# Patient Record
Sex: FEMALE | Race: AMERICAN INDIAN OR ALASKA NATIVE | NOT HISPANIC OR LATINO | URBAN - METROPOLITAN AREA
[De-identification: names, ages, dates, MRNs, and addresses within clinical notes are randomized per-mention and may not be internally consistent; named-entity substitution may affect disease eponyms.]

---

## 2021-06-14 ENCOUNTER — EMERGENCY (EMERGENCY)
Facility: HOSPITAL | Age: 31
LOS: 0 days | Discharge: HOME | End: 2021-06-15
Attending: STUDENT IN AN ORGANIZED HEALTH CARE EDUCATION/TRAINING PROGRAM | Admitting: STUDENT IN AN ORGANIZED HEALTH CARE EDUCATION/TRAINING PROGRAM
Payer: MEDICAID

## 2021-06-14 VITALS
SYSTOLIC BLOOD PRESSURE: 126 MMHG | RESPIRATION RATE: 18 BRPM | HEART RATE: 80 BPM | TEMPERATURE: 97 F | DIASTOLIC BLOOD PRESSURE: 86 MMHG | WEIGHT: 149.91 LBS | OXYGEN SATURATION: 100 %

## 2021-06-14 DIAGNOSIS — R10.32 LEFT LOWER QUADRANT PAIN: ICD-10-CM

## 2021-06-14 DIAGNOSIS — N94.89 OTHER SPECIFIED CONDITIONS ASSOCIATED WITH FEMALE GENITAL ORGANS AND MENSTRUAL CYCLE: ICD-10-CM

## 2021-06-14 DIAGNOSIS — R11.0 NAUSEA: ICD-10-CM

## 2021-06-14 DIAGNOSIS — R10.31 RIGHT LOWER QUADRANT PAIN: ICD-10-CM

## 2021-06-14 DIAGNOSIS — Z87.42 PERSONAL HISTORY OF OTHER DISEASES OF THE FEMALE GENITAL TRACT: ICD-10-CM

## 2021-06-14 PROCEDURE — 99284 EMERGENCY DEPT VISIT MOD MDM: CPT

## 2021-06-14 NOTE — ED ADULT TRIAGE NOTE - CHIEF COMPLAINT QUOTE
pt c/o abdominal pain started yesterday worsesned today around 7 pm ,   unknown lmp, pt breastfeeding

## 2021-06-15 LAB
ALBUMIN SERPL ELPH-MCNC: 4.6 G/DL — SIGNIFICANT CHANGE UP (ref 3.5–5.2)
ALP SERPL-CCNC: 106 U/L — SIGNIFICANT CHANGE UP (ref 30–115)
ALT FLD-CCNC: 13 U/L — SIGNIFICANT CHANGE UP (ref 0–41)
ANION GAP SERPL CALC-SCNC: 8 MMOL/L — SIGNIFICANT CHANGE UP (ref 7–14)
APPEARANCE UR: CLEAR — SIGNIFICANT CHANGE UP
AST SERPL-CCNC: 13 U/L — SIGNIFICANT CHANGE UP (ref 0–41)
BACTERIA # UR AUTO: NEGATIVE — SIGNIFICANT CHANGE UP
BASOPHILS # BLD AUTO: 0.04 K/UL — SIGNIFICANT CHANGE UP (ref 0–0.2)
BASOPHILS NFR BLD AUTO: 0.6 % — SIGNIFICANT CHANGE UP (ref 0–1)
BILIRUB SERPL-MCNC: 0.3 MG/DL — SIGNIFICANT CHANGE UP (ref 0.2–1.2)
BILIRUB UR-MCNC: NEGATIVE — SIGNIFICANT CHANGE UP
BUN SERPL-MCNC: 12 MG/DL — SIGNIFICANT CHANGE UP (ref 10–20)
CALCIUM SERPL-MCNC: 9.3 MG/DL — SIGNIFICANT CHANGE UP (ref 8.5–10.1)
CHLORIDE SERPL-SCNC: 102 MMOL/L — SIGNIFICANT CHANGE UP (ref 98–110)
CO2 SERPL-SCNC: 28 MMOL/L — SIGNIFICANT CHANGE UP (ref 17–32)
COLOR SPEC: YELLOW — SIGNIFICANT CHANGE UP
CREAT SERPL-MCNC: 0.6 MG/DL — LOW (ref 0.7–1.5)
DIFF PNL FLD: SIGNIFICANT CHANGE UP
EOSINOPHIL # BLD AUTO: 0.36 K/UL — SIGNIFICANT CHANGE UP (ref 0–0.7)
EOSINOPHIL NFR BLD AUTO: 5.1 % — SIGNIFICANT CHANGE UP (ref 0–8)
EPI CELLS # UR: 2 /HPF — SIGNIFICANT CHANGE UP (ref 0–5)
GLUCOSE SERPL-MCNC: 89 MG/DL — SIGNIFICANT CHANGE UP (ref 70–99)
GLUCOSE UR QL: NEGATIVE — SIGNIFICANT CHANGE UP
HCG SERPL QL: NEGATIVE — SIGNIFICANT CHANGE UP
HCT VFR BLD CALC: 37.1 % — SIGNIFICANT CHANGE UP (ref 37–47)
HGB BLD-MCNC: 12.6 G/DL — SIGNIFICANT CHANGE UP (ref 12–16)
HYALINE CASTS # UR AUTO: 2 /LPF — SIGNIFICANT CHANGE UP (ref 0–7)
IMM GRANULOCYTES NFR BLD AUTO: 0.3 % — SIGNIFICANT CHANGE UP (ref 0.1–0.3)
KETONES UR-MCNC: SIGNIFICANT CHANGE UP
LACTATE SERPL-SCNC: 1.5 MMOL/L — SIGNIFICANT CHANGE UP (ref 0.7–2)
LEUKOCYTE ESTERASE UR-ACNC: ABNORMAL
LIDOCAIN IGE QN: 42 U/L — SIGNIFICANT CHANGE UP (ref 7–60)
LYMPHOCYTES # BLD AUTO: 2.84 K/UL — SIGNIFICANT CHANGE UP (ref 1.2–3.4)
LYMPHOCYTES # BLD AUTO: 40.2 % — SIGNIFICANT CHANGE UP (ref 20.5–51.1)
MCHC RBC-ENTMCNC: 31.8 PG — HIGH (ref 27–31)
MCHC RBC-ENTMCNC: 34 G/DL — SIGNIFICANT CHANGE UP (ref 32–37)
MCV RBC AUTO: 93.7 FL — SIGNIFICANT CHANGE UP (ref 81–99)
MONOCYTES # BLD AUTO: 0.56 K/UL — SIGNIFICANT CHANGE UP (ref 0.1–0.6)
MONOCYTES NFR BLD AUTO: 7.9 % — SIGNIFICANT CHANGE UP (ref 1.7–9.3)
NEUTROPHILS # BLD AUTO: 3.25 K/UL — SIGNIFICANT CHANGE UP (ref 1.4–6.5)
NEUTROPHILS NFR BLD AUTO: 45.9 % — SIGNIFICANT CHANGE UP (ref 42.2–75.2)
NITRITE UR-MCNC: NEGATIVE — SIGNIFICANT CHANGE UP
NRBC # BLD: 0 /100 WBCS — SIGNIFICANT CHANGE UP (ref 0–0)
PH UR: 6 — SIGNIFICANT CHANGE UP (ref 5–8)
PLATELET # BLD AUTO: 261 K/UL — SIGNIFICANT CHANGE UP (ref 130–400)
POTASSIUM SERPL-MCNC: 4.2 MMOL/L — SIGNIFICANT CHANGE UP (ref 3.5–5)
POTASSIUM SERPL-SCNC: 4.2 MMOL/L — SIGNIFICANT CHANGE UP (ref 3.5–5)
PROT SERPL-MCNC: 7.1 G/DL — SIGNIFICANT CHANGE UP (ref 6–8)
PROT UR-MCNC: SIGNIFICANT CHANGE UP
RBC # BLD: 3.96 M/UL — LOW (ref 4.2–5.4)
RBC # FLD: 11.7 % — SIGNIFICANT CHANGE UP (ref 11.5–14.5)
RBC CASTS # UR COMP ASSIST: 3 /HPF — SIGNIFICANT CHANGE UP (ref 0–4)
SODIUM SERPL-SCNC: 138 MMOL/L — SIGNIFICANT CHANGE UP (ref 135–146)
SP GR SPEC: 1.03 — SIGNIFICANT CHANGE UP (ref 1.01–1.03)
UROBILINOGEN FLD QL: SIGNIFICANT CHANGE UP
WBC # BLD: 7.07 K/UL — SIGNIFICANT CHANGE UP (ref 4.8–10.8)
WBC # FLD AUTO: 7.07 K/UL — SIGNIFICANT CHANGE UP (ref 4.8–10.8)
WBC UR QL: 17 /HPF — HIGH (ref 0–5)

## 2021-06-15 PROCEDURE — 76830 TRANSVAGINAL US NON-OB: CPT | Mod: 26

## 2021-06-15 PROCEDURE — 74177 CT ABD & PELVIS W/CONTRAST: CPT | Mod: 26,MA

## 2021-06-15 RX ORDER — ACETAMINOPHEN 500 MG
650 TABLET ORAL ONCE
Refills: 0 | Status: COMPLETED | OUTPATIENT
Start: 2021-06-15 | End: 2021-06-15

## 2021-06-15 NOTE — ED PROVIDER NOTE - OBJECTIVE STATEMENT
31 year old female with pmhx of ovarian torsion,  presents to ED with lower abdominal pain x few days. Pain to lower abdomen, L>R. cramping, no radiation. mild nausea. no vomiting, diarrhea, fever, chills, chest pain, sob, urinary symptoms, vaginal bleeding or discharge.

## 2021-06-15 NOTE — ED PROVIDER NOTE - NSFOLLOWUPINSTRUCTIONS_ED_ALL_ED_FT
????????????????(?????? 6 ????)????????????????????????,?????????????(??????)????????????????????????????????    ???????????????,?????????????????????????????    ?????????????????? 20 ? 45 ???,???????????    ??????????  ??????????????,????????????????,??????????????  Pénqiang yuxie zònghé mckee [facility] shì [facility] pénqiang téngtòng (pénqiang téngtòng chíxù 6 gè yuè yishàng) de [facility]. Téngtòng de fa sulma shì yinwèi xieyè zài gupén jìngmài anjali lyle, zhèxie jìngmài yijing kuòzhang bìng biàn dé fùzá (nik arias jìngmài donaldzhang). Yóu ci chansheng de téngtòng youí huì emily kochruò. Cí jisù kenéng you zhù yú zhèxie jìngmài de fa yù.    Henduo yùlíng fùnu de gupén kenyon you jìngmài quzhang, raeann celesting bùshì suoyou rén kenyon you zhèngzhuàng. Wèishéme youxie nuxìng huì chuxiàn zhèngzhuàng shànOrlando Health St. Cloud Hospitalu.    Dà duoshù huàn you pénqiang chongxuè zònghé mckee de nuxìng niánlíng zài 20 zhì 45 suì dalton srini, bìngqie youguò shahrzad cì huáiyùn jinglì.    Pénqiang chongxuè zòntamyé mckee de festusrosalindamaurizio  chiquita weaver you pénpepe chongxuè zòntamyé mckee de aakash williams, tong delgadillo, remi gonzales mildred richards.    ??,???????,??????????????????(???????????)????,????????????????????????????????????????????    ???????????????????????    ??????????????????????    ??????????  ????????????  ???????  ????????????????????????,???????????????????????????,???????? 6 ????,??????????????    ????????????????????????????????,????????????????????????????????(?????????????????????? X ??)???????? (CT)?????? (MRI) ?????????    ??????????????,??????????????,??????????????????????????????    ??????????  ?????????????????  ????,???????????????  ???????????????????????(????????????)???????? (NSAID) ?????????? (GnRH) ???(????????????),??????????,???????????    ????????,?????????????????,???????????????????:    ????:?????????????????,??????????????,????????????(??)????????????????????????????????????????????????  ????:??,??????????????????????????????  ????????????????,????????  Tongcháng, téngtòng shì raeann gonzalez de rosalindaò karina cox de. Anais saravia shí (nuxìng cháng shíjian zuòzhe [facility]) qíngkuàng gèng zao, archibald xià hòu huì you suo huanjie. Xìnglisa martinez zhOhioHealth Arthur [facility], [facility] de téngtòng broderick vieraDzilth-Na-O-Dith-Hle Health Centercrys. Bashir tongcháng bàn you yaotòng, tui bù téngtòng hé yindào yìMercy Hospitalcrys chue.    Youxie nuxìng ou'er huì trangg yindina alba páichu tòumíng huò shui yàng de [facility].    Qíta zhèngzhuàng kenéng baokuò píláo, qíngxù bodòng, tóutelmer ball.    Pénqiang chongxuè zònghé mckee de zheBanner Ironwood Medical Center  chaosheng jianchá huò qíta yingxiàng xué jianchá  youshí fùqiang jìng jianchá  kwon nuxìng you pénqiang téngtòng dàn pénqiang jianchá wèi faxiàn yánzhèng huò qíta yìcháng shí, yisheng kenéng huì huáiyí pénqiang yuxie zònghé mckee. Yisheng zhenduàn gupén chongxuè daniellaghé mckee shí, téngtòng bìxu yijing cúnzài 6 gè yuè yishàng, bìngqie zài jianchá shí luancháo bìxu lilly oshea.    Chaoshengbo jianchá pénqiang jìngmài quzhang keyi bangRoosevelt General Hospital yisheng quèrèn pénqiang yuxie daniellaghé mckee de [facility]. Dànshì, kenéng xuyào jìnxíng lìng yicì yingxiàng xué jianchá lái quèrèn [facility]. Zhèxie cèshì kenéng baokuò jìngmài zàoying (john connor tòushèxiàn de zàoying karina rader fùgugou jìngmài hòu paishè de jìngmài X linda huitron), gold glover (CT), cí gòngtreasurehèn mitzi (MRI) hé cí michelhèn jìngmài zàoying.    Rúguo téngtòng hen máfan qie yuányin shàngwèi quèdìng, zé jìnxíng fùqiang jìng jianchá. Zài ci aletha alba, poppy tellezì zài alexandroqí amanda naylor mamie qièkou bìng charù rosa whitehead [place] zhíjie guanchá gupén jiégòu.    Pénqiang chongxevan ashtoné mckee de zhìliáo  tongarmin mandel yùn jimarilyn goldman huò glen joby ti kàng yán yào  rúguo xuyào, cyndy chéngxù lái zuzhi xieyè liúxiàng jìngmài quzjassig  tongParkview Health ziggy mccain. Bashir alexander ( xìngjisù huángti tóng de AR[facility] the Way Hospitalhén xínLifePoint Health). Glen joby ti lèi kàng aylin taylor (NSAID) huò cù xìngxiàn jisù shìfmaurizio alexander (GnRH) igor collins (shenti chansheng de jisù de [facility]), rú liàng jacinto ruì lín hé nà fa ruì lín, ye kenéng you zhù yú huanjie téngtòng.    Milenauo zhèxie yàowù wútulioào, yisheng kenjovanny tellezì shìtú zuzhi xieyè liúxiàng jìngmài qulatrell, cóng'ér fángdalton xieyè zài nàli serina rouse keyòng:    Ziongmài taniai: Chiquita garces mázuì dàtui de [place] mamie kuài delicia buenou, poppy zài nàli zuò yigè mamie qièkou. [facility], tamen john lieberman gen xì ér róuruan de [facility]da[facility]) tongguò qièkou charù jìngmài jana alberto jìngmài nir. Tamen john wéixiao de tomyflor, [facility] Hospitaltre hudiana gonzalez zhuàng yèti tongArtesia General Hospital daoguan charù jìngmài [place] zusè tamen.  Yìnghuà liáofa: poppy [place]ù daogsherley celesting tongò dauan john morangyè zhùrù jìngmài quzjassig. Anil amandausè jìngmài.  Johnna zaragoza jìngmài adán cervantes. Pénqiang yuxie zònghé mckee sì shì mànxìng pénqiang téngtòng (pénqiang téngtòng chíxù 6 gè yuè yishàng) de arminjennifer pradoSlovaktre. Téngtòng de fa sulma shì yinwèi xieyè zài gupén jìngmài anjali lyle, zhèxie jìngmài anuja kuòzjassig bìng biàn dé fùzá (nik loyola). Yóu ci chansheng de téngtòng youshí huì emily rén xuruò. Cí jisù kenéng you misti alejo zhèxie jìngmài de fa yù.    Henduo yùlíng fùnu de gupén kenyon you gudelia loyola, raeann bates bùshì suoyou rén kenyon you festusmaurizio. Wèishéme youxie nuxìng huì chuxiàn zhèngtreasuremaurizio connor qingu.    Dà duoshù hujennifer you pénqiang chongxuè zònghé mckee de nuMedfield State Hospital zài 20 zhì 45 suì dalton milligan, bìngqie youguò ji cì huáiyùn jinglì.    Pénqiang chongxuè zònghé mckee de michaelCibola General Hospitali hujennifer you pénqiang chongxuè zònghé mckee de nuFreeman Neosho Hospital williams, dianeqiang téngtòng tongchácrys Mountain View Hospitalhen zài huáiyùn tim martinez zhandrzej, sabigqie suízhe andrews cì huáiyùn ér qu yú èhuà.          Elena, téngtòng raeann irving de michelle cox de. Yitian jiéshù shí (nuxìng cháng shíjian zuòzhe huò zhànlì hòu) qíngmariola gènedilma guerra archibald iva hòu huì you suo karle. Xìngjiao tim martinez zhihòu de téngtUMass Memorial Medical Center broderick lizama. Ta tongcháng bàn you yaotòcrys tui geeta téngtòng hé yindào yìcháng chuxie.    Youxie nuxìng ou'er huì trangg yasmin alba páiisabelu tòumíng huò shui yàng de fenmì wù.    Qíta zhèngzhuàng kenéng baokuò píláo, qíngxù bodòng, tóutòng hé fùzhàng.    Pénqiang chongxuè polié mckee de zhenduàn  chaosheng jianchá huò qíta yingxiàng xué jianchá  youshí fùqiang jìng jianchá  kwon nuxìng you pénqiang téngtòng dàn pénqiang jianchá wèi faxijennifer yántreasurehèng huò qíta yìcháng shí, yisheng kenéng huì huáiyí pénqiang yuxie polié mckee. Yisheng zhenduàn gupén chongxuè polié mckee shí, téngtòng bìxu yijing cúnzài 6 gè yuè yishàng, bìngqie zài jianchá shí luancháo bìxu lilly oshea.    Chaoshengbo jianchá pénqiang jìngmài quzhang keyi banghù yisheng quèrèn pénqiang barrington mckee de [facility]. Dànshì, kenéng xuyào jìnxíng lìng yicì yingxiàng xué jianchá lái quèrèn zhendCatawba Valley Medical Center. Zhèxie cèshì kenéng baokuò jìngmài zàoying (john sniderushèxiàn de zàoying karina rader fùgugou jìngmài hòu paishè de jìngmài SARAH huitron), jìsuànji duàncéng saomiáo (CT), cí gòngzhèn chéngxiàng (MRI) hé cí gòngzhèn jìngmài zàoying.    Rúguo téngtòng hen máfan qie johanyin maei quèstanley, zé jìnxíng fùqiang birdg jianchá. Zài ci aletha alba, yisulma huì zài dùqí xiàjosafat liebermangè mamie qièkou bìng charù guanedwar charley [place] zhíjie guanchá gupén jiégòu.    Pénqiang chongxevan ashtoné mckee de zhAlta View Hospitalo  Lawrence General Hospitalcrys goldman huò glen joby ti kàng yán yào  rúguo xuyào, yigè chéngxù lái zuzhi michaelyè liúxiàng jìngmài qusanag  tongchácrys mccain. Bashir baires jisù ( xìngjisù huángti tóng de héchéng xínBon Secours St. Mary's Hospital). Glen joby ti lèi kàng aylin claudia (NSAID) huò cù xìngxiàn jisù shìfmaurizio alexander (GnRH) igor collins (shenti chansheng de jisù de héchéng xíngshì), rú liàng jacinto ruì lín hé nà fa ruì lín, ye kenéng you zhù yú huanjie téngtòng.    Rúguo zhèxie yàcookieù wúxiào, yisheng kenjovanny tellezì andraú zuzmicah rodriguezyè liúxiàng jìngmài nir, trangg'mildred hintongdalton clark zài nàli dariela. Lilly mccrackenòng:    Jìngmài charliesai: Chiquita garces mázuì dàtui de [place] mamie irasema buenou, poppy zài nàli moris liebermangè mamie qièkou. [facility], tamen john lieberman gen xì ér róuruan de [facility] (daRipley County Memorial [facility]) tongguò qièkou souleymaneù birdgmàellen alberto jìngmài qulatrell. Tamen john brown de Christian Hospitalflor [facility] Hospitaltre gonzalez tongguò daoguan souleymaneù jìngmài luisana ann tamen.  Yìnghuà liáofa: poppy [place] daogsherley bates tongguò daoguan john rader jìngmài quzrosalba. Anil amandausè jìngmài.  Johnna berumeni liúxiàng radha zaragoza jìngmài adán cervantesCincinnati VA Medical Centercrys vera.

## 2021-06-15 NOTE — ED PROVIDER NOTE - PROGRESS NOTE DETAILS
DISCUSSED RESULTS WITH MANDARIAN , AWARE OF PELVIC CONGESTION SYNDROME., AND ALL OTHER RESULTS - WILL FOLLOW UP WITH HER GYN TOMORROW. PT FEELING MUCH BETTER, NO ACTIVE PAIN

## 2021-06-15 NOTE — ED PROVIDER NOTE - ATTENDING CONTRIBUTION TO CARE
31 yr old f w/ a pmh significant for ovarian torsion,  who presents with lower abd pain. Pt states that for the past couple of days she has been having lower abd pain. Pt denies any vaginal bleeding, vaginal discharge or urinary symptoms. Pt also denies any fevers, chills, nausea, vomiting, chest pain, sob or any other complaints.     VITAL SIGNS: I have reviewed nursing notes and confirm.  CONSTITUTIONAL: non-toxic, well appearing  SKIN: no rash, no petechiae.  EYES: EOMI, pink conjunctiva, anicteric  ENT: tongue midline, no exudates, MMM  NECK: Supple; no meningismus, no JVD  CARD: RRR, no murmurs, equal radial pulses bilaterally 2+  RESP: CTAB, no respiratory distress  ABD: Soft, tender in the lower abd, non-distended, no peritoneal signs, no HSM, no CVA tenderness  EXT: Normal ROM x4. No edema. No calves tenderness  NEURO: Alert, oriented. CN2-12 intact, equal strength bilaterally, nl gait.  PSYCH: Cooperative, appropriate.    a/p  31 yr old f that presents with abd pain   -labs  -ua  -us  -pain management  -dispo pending : [id number]    31 yr old f w/ a pmh significant for ovarian torsion,  who presents with lower abd pain. Pt states that for the past couple of days she has been having lower abd pain. Pt denies any vaginal bleeding, vaginal discharge or urinary symptoms. Pt also denies any fevers, chills, nausea, vomiting, chest pain, sob or any other complaints.     VITAL SIGNS: I have reviewed nursing notes and confirm.  CONSTITUTIONAL: non-toxic, well appearing  SKIN: no rash, no petechiae.  EYES: EOMI, pink conjunctiva, anicteric  ENT: tongue midline, no exudates, MMM  NECK: Supple; no meningismus, no JVD  CARD: RRR, no murmurs, equal radial pulses bilaterally 2+  RESP: CTAB, no respiratory distress  ABD: Soft, tender in the lower abd, non-distended, no peritoneal signs, no HSM, no CVA tenderness  EXT: Normal ROM x4. No edema. No calves tenderness  NEURO: Alert, oriented. CN2-12 intact, equal strength bilaterally, nl gait.  PSYCH: Cooperative, appropriate.    a/p  31 yr old f that presents with abd pain   -labs  -ua  -us  -pain management  -dispo pending

## 2021-06-15 NOTE — ED PROVIDER NOTE - PHYSICAL EXAMINATION
CONST: Well appearing in NAD  EYES: PERRL, EOMI, Sclera and conjunctiva clear.   NECK: Non-tender, no meningeal signs. normal ROM. supple   CARD: Normal S1 S2; Normal rate and rhythm  RESP: Equal BS B/L, No wheezes, rhonchi or rales. No distress  GI: Soft, mild left lower pelvic tenderness, non-distended. no cva tenderness. normal BS  GYN: no discharge or bleeding. no cmt . no adnexal tenderness  MS: Normal ROM in all extremities. No midline spinal tenderness. pulses 2 +. no calf tenderness or swelling  SKIN: Warm, dry, no acute rashes. Good turgor  NEURO: A&Ox3, No focal deficits.

## 2021-06-15 NOTE — ED PROVIDER NOTE - PATIENT PORTAL LINK FT
You can access the FollowMyHealth Patient Portal offered by [facility] by registering at the following website: [email or web address]. By joining [employer]’s FollowMyHealth portal, you will also be able to view your health information using other applications (apps) compatible with our system.

## 2021-06-15 NOTE — ED PROVIDER NOTE - CLINICAL SUMMARY MEDICAL DECISION MAKING FREE TEXT BOX
31 yr old f that presents with abd pain. labs, ua, us obtained. pt informed of all findings. pt discharged with gyn follow up and strict return precautions.

## 2021-06-16 LAB
CULTURE RESULTS: SIGNIFICANT CHANGE UP
SPECIMEN SOURCE: SIGNIFICANT CHANGE UP

## 2021-06-18 PROBLEM — Z00.00 ENCOUNTER FOR PREVENTIVE HEALTH EXAMINATION: Status: ACTIVE | Noted: 2021-06-18

## 2022-01-07 ENCOUNTER — EMERGENCY (EMERGENCY)
Facility: HOSPITAL | Age: 32
LOS: 0 days | Discharge: HOME | End: 2022-01-07
Attending: EMERGENCY MEDICINE | Admitting: EMERGENCY MEDICINE
Payer: MEDICAID

## 2022-01-07 VITALS
SYSTOLIC BLOOD PRESSURE: 115 MMHG | DIASTOLIC BLOOD PRESSURE: 77 MMHG | WEIGHT: 154.98 LBS | OXYGEN SATURATION: 100 % | HEART RATE: 62 BPM | TEMPERATURE: 98 F | RESPIRATION RATE: 18 BRPM

## 2022-01-07 DIAGNOSIS — R10.2 PELVIC AND PERINEAL PAIN: ICD-10-CM

## 2022-01-07 DIAGNOSIS — R11.0 NAUSEA: ICD-10-CM

## 2022-01-07 DIAGNOSIS — Z98.890 OTHER SPECIFIED POSTPROCEDURAL STATES: Chronic | ICD-10-CM

## 2022-01-07 DIAGNOSIS — K59.00 CONSTIPATION, UNSPECIFIED: ICD-10-CM

## 2022-01-07 DIAGNOSIS — Z87.42 PERSONAL HISTORY OF OTHER DISEASES OF THE FEMALE GENITAL TRACT: ICD-10-CM

## 2022-01-07 DIAGNOSIS — N39.0 URINARY TRACT INFECTION, SITE NOT SPECIFIED: ICD-10-CM

## 2022-01-07 LAB
ALBUMIN SERPL ELPH-MCNC: 4.4 G/DL — SIGNIFICANT CHANGE UP (ref 3.5–5.2)
ALP SERPL-CCNC: 80 U/L — SIGNIFICANT CHANGE UP (ref 30–115)
ALT FLD-CCNC: 27 U/L — SIGNIFICANT CHANGE UP (ref 0–41)
ANION GAP SERPL CALC-SCNC: 11 MMOL/L — SIGNIFICANT CHANGE UP (ref 7–14)
APPEARANCE UR: CLEAR — SIGNIFICANT CHANGE UP
AST SERPL-CCNC: 20 U/L — SIGNIFICANT CHANGE UP (ref 0–41)
BACTERIA # UR AUTO: NEGATIVE — SIGNIFICANT CHANGE UP
BASOPHILS # BLD AUTO: 0.05 K/UL — SIGNIFICANT CHANGE UP (ref 0–0.2)
BASOPHILS NFR BLD AUTO: 0.5 % — SIGNIFICANT CHANGE UP (ref 0–1)
BILIRUB SERPL-MCNC: <0.2 MG/DL — SIGNIFICANT CHANGE UP (ref 0.2–1.2)
BILIRUB UR-MCNC: NEGATIVE — SIGNIFICANT CHANGE UP
BUN SERPL-MCNC: 17 MG/DL — SIGNIFICANT CHANGE UP (ref 10–20)
CALCIUM SERPL-MCNC: 9.3 MG/DL — SIGNIFICANT CHANGE UP (ref 8.5–10.1)
CHLORIDE SERPL-SCNC: 102 MMOL/L — SIGNIFICANT CHANGE UP (ref 98–110)
CO2 SERPL-SCNC: 23 MMOL/L — SIGNIFICANT CHANGE UP (ref 17–32)
COLOR SPEC: SIGNIFICANT CHANGE UP
CREAT SERPL-MCNC: 0.7 MG/DL — SIGNIFICANT CHANGE UP (ref 0.7–1.5)
DIFF PNL FLD: SIGNIFICANT CHANGE UP
EOSINOPHIL # BLD AUTO: 0.29 K/UL — SIGNIFICANT CHANGE UP (ref 0–0.7)
EOSINOPHIL NFR BLD AUTO: 3.2 % — SIGNIFICANT CHANGE UP (ref 0–8)
EPI CELLS # UR: 5 /HPF — SIGNIFICANT CHANGE UP (ref 0–5)
GLUCOSE SERPL-MCNC: 90 MG/DL — SIGNIFICANT CHANGE UP (ref 70–99)
GLUCOSE UR QL: NEGATIVE — SIGNIFICANT CHANGE UP
HCT VFR BLD CALC: 40.4 % — SIGNIFICANT CHANGE UP (ref 37–47)
HGB BLD-MCNC: 13.3 G/DL — SIGNIFICANT CHANGE UP (ref 12–16)
HYALINE CASTS # UR AUTO: 7 /LPF — SIGNIFICANT CHANGE UP (ref 0–7)
IMM GRANULOCYTES NFR BLD AUTO: 1.9 % — HIGH (ref 0.1–0.3)
KETONES UR-MCNC: NEGATIVE — SIGNIFICANT CHANGE UP
LEUKOCYTE ESTERASE UR-ACNC: ABNORMAL
LYMPHOCYTES # BLD AUTO: 3.48 K/UL — HIGH (ref 1.2–3.4)
LYMPHOCYTES # BLD AUTO: 38.2 % — SIGNIFICANT CHANGE UP (ref 20.5–51.1)
MCHC RBC-ENTMCNC: 30.8 PG — SIGNIFICANT CHANGE UP (ref 27–31)
MCHC RBC-ENTMCNC: 32.9 G/DL — SIGNIFICANT CHANGE UP (ref 32–37)
MCV RBC AUTO: 93.5 FL — SIGNIFICANT CHANGE UP (ref 81–99)
MONOCYTES # BLD AUTO: 0.65 K/UL — HIGH (ref 0.1–0.6)
MONOCYTES NFR BLD AUTO: 7.1 % — SIGNIFICANT CHANGE UP (ref 1.7–9.3)
NEUTROPHILS # BLD AUTO: 4.46 K/UL — SIGNIFICANT CHANGE UP (ref 1.4–6.5)
NEUTROPHILS NFR BLD AUTO: 49.1 % — SIGNIFICANT CHANGE UP (ref 42.2–75.2)
NITRITE UR-MCNC: NEGATIVE — SIGNIFICANT CHANGE UP
NRBC # BLD: 0 /100 WBCS — SIGNIFICANT CHANGE UP (ref 0–0)
PH UR: 6.5 — SIGNIFICANT CHANGE UP (ref 5–8)
PLATELET # BLD AUTO: 277 K/UL — SIGNIFICANT CHANGE UP (ref 130–400)
POTASSIUM SERPL-MCNC: 4.4 MMOL/L — SIGNIFICANT CHANGE UP (ref 3.5–5)
POTASSIUM SERPL-SCNC: 4.4 MMOL/L — SIGNIFICANT CHANGE UP (ref 3.5–5)
PROT SERPL-MCNC: 6.9 G/DL — SIGNIFICANT CHANGE UP (ref 6–8)
PROT UR-MCNC: ABNORMAL
RBC # BLD: 4.32 M/UL — SIGNIFICANT CHANGE UP (ref 4.2–5.4)
RBC # FLD: 11.6 % — SIGNIFICANT CHANGE UP (ref 11.5–14.5)
RBC CASTS # UR COMP ASSIST: 2 /HPF — SIGNIFICANT CHANGE UP (ref 0–4)
SODIUM SERPL-SCNC: 136 MMOL/L — SIGNIFICANT CHANGE UP (ref 135–146)
SP GR SPEC: 1.03 — HIGH (ref 1.01–1.03)
UROBILINOGEN FLD QL: SIGNIFICANT CHANGE UP
WBC # BLD: 9.1 K/UL — SIGNIFICANT CHANGE UP (ref 4.8–10.8)
WBC # FLD AUTO: 9.1 K/UL — SIGNIFICANT CHANGE UP (ref 4.8–10.8)
WBC UR QL: 27 /HPF — HIGH (ref 0–5)

## 2022-01-07 PROCEDURE — 99285 EMERGENCY DEPT VISIT HI MDM: CPT

## 2022-01-07 PROCEDURE — 76830 TRANSVAGINAL US NON-OB: CPT | Mod: 26

## 2022-01-07 PROCEDURE — 74177 CT ABD & PELVIS W/CONTRAST: CPT | Mod: 26,MA

## 2022-01-07 RX ORDER — ONDANSETRON 8 MG/1
4 TABLET, FILM COATED ORAL ONCE
Refills: 0 | Status: COMPLETED | OUTPATIENT
Start: 2022-01-07 | End: 2022-01-07

## 2022-01-07 RX ORDER — SODIUM CHLORIDE 9 MG/ML
1000 INJECTION INTRAMUSCULAR; INTRAVENOUS; SUBCUTANEOUS ONCE
Refills: 0 | Status: COMPLETED | OUTPATIENT
Start: 2022-01-07 | End: 2022-01-07

## 2022-01-07 RX ORDER — CEPHALEXIN 500 MG
1 CAPSULE ORAL
Qty: 15 | Refills: 0
Start: 2022-01-07 | End: 2022-01-11

## 2022-01-07 RX ORDER — KETOROLAC TROMETHAMINE 30 MG/ML
30 SYRINGE (ML) INJECTION ONCE
Refills: 0 | Status: DISCONTINUED | OUTPATIENT
Start: 2022-01-07 | End: 2022-01-07

## 2022-01-07 RX ADMIN — SODIUM CHLORIDE 1000 MILLILITER(S): 9 INJECTION INTRAMUSCULAR; INTRAVENOUS; SUBCUTANEOUS at 00:55

## 2022-01-07 RX ADMIN — ONDANSETRON 4 MILLIGRAM(S): 8 TABLET, FILM COATED ORAL at 00:55

## 2022-01-07 RX ADMIN — Medication 30 MILLIGRAM(S): at 00:55

## 2022-01-07 NOTE — ED PROVIDER NOTE - OBJECTIVE STATEMENT
32 yo female hx of PCOS/ pelvic congestion syndrome present c/o pelvic pain off/on x 1 week. reported pain was associated with constipation last week and improved after taking pain medication. pain associated with nausea. pain started again during urination tonight and didn't improve with pain medication so she comes to ED for evaluation. LMP > 1 year ago (reported it is due to breast feeding over the past year).   Denies fever/chill/vomiting/diarrhea/change of appetite. Denies HA/dizziness/chest pain/sob/palpitations and pain to extremities and ambulatory difficulty.

## 2022-01-07 NOTE — ED PROVIDER NOTE - PHYSICAL EXAMINATION
CONSTITUTIONAL: Well-appearing; well-nourished; in no apparent distress.   EYES: PERRL; EOM intact.   CARDIOVASCULAR: Normal S1, S2; no murmurs, rubs, or gallops.   RESPIRATORY: Normal chest excursion with respiration; breath sounds clear and equal bilaterally; no wheezes, rhonchi, or rales.  GI: + right pelvic tenderness. Normal bowel sounds; non-distended; no palpable organomegaly.   MS: No calf swelling and tenderness.  SKIN: Normal for age and race; warm; dry; good turgor; no apparent lesions or exudate.   NEURO/PSYCH: A & O x 4; grossly unremarkable.

## 2022-01-07 NOTE — ED ADULT NURSE NOTE - NSIMPLEMENTINTERV_GEN_ALL_ED
Implemented All Universal Safety Interventions:  [place] to call system. Call bell, personal items and telephone within reach. Instruct patient to call for assistance. Room bathroom lighting operational. Non-slip footwear when patient is off stretcher. Physically safe environment: no spills, clutter or unnecessary equipment. Stretcher in lowest position, wheels locked, appropriate side rails in place.

## 2022-01-07 NOTE — ED PROVIDER NOTE - PRINCIPAL DIAGNOSIS
Monitor your blood pressure twice a day (once you wake up and before bedtime).  Call doctor if:  -- your blood pressure for the top/upper number is greater than 140 or less than 90  -- your blood pressure for the bottom/lower number is greater than 90 or less than 60    Take Singulair as directed and call doctor if your nasal congestion and cough persist/worsens, or if you develop new symptoms or side effects from the medication.    Follow up for weight management.      
Pain pelvic

## 2022-01-07 NOTE — ED PROVIDER NOTE - ATTENDING CONTRIBUTION TO CARE
I personally evaluated the patient. I reviewed the Resident’s or Physician Assistant’s note (as assigned above), and agree with the findings and plan except as documented in my note.    farhad  Mireille [id number]  31 F with hx of PCOS, pelvic congestion syndrome and ovarian torsion with complaints of several days of intermittent right lower abd pain. Associated with nausea without vomiting. No fever, no dyuria or hematuria although pt states that pain increases after urination.  VS reviewed, pt non-toxic appearing, NAD. Head ncat, MMM, neck supple, normal ROM, normal s1s2 without any murmurs, Lungs CTAB with normal work of breathing. abd +BS, s/nd/+ ttp of the right lower quadrant and the left upper quadrant without guarding or reboun, extremities wnl, neuro exam grossly normal. No acute skin rashes. Plan is labs, imaging, meds as needed and reassess. I personally evaluated the patient. I reviewed the Resident’s or Physician Assistant’s note (as assigned above), and agree with the findings and plan except as documented in my note.    farhad  Mireille [id number]  31 F with hx of PCOS, pelvic congestion syndrome and ovarian torsion with complaints of several days of intermittent right lower abd pain. Associated with nausea without vomiting. No fever, no dyuria or hematuria although pt states that pain increases after urination.  VS reviewed, pt non-toxic appearing, NAD. Head ncat, MMM, neck supple, normal ROM, normal s1s2 without any murmurs, Lungs CTAB with normal work of breathing. abd +BS, s/nd/+ ttp of the right lower quadrant and the left upper quadrant without guarding or rebound, extremities wnl, neuro exam grossly normal. No acute skin rashes. Plan is labs, imaging, meds as needed and reassess.

## 2022-01-07 NOTE — ED PROVIDER NOTE - PATIENT PORTAL LINK FT
You can access the FollowMyHealth Patient Portal offered by [facility] by registering at the following website: [email or web address]. By joining [employer]’s FollowMyHealth portal, you will also be able to view your health information using other applications (apps) compatible with our system.

## 2022-01-07 NOTE — ED PROVIDER NOTE - NSFOLLOWUPINSTRUCTIONS_ED_ALL_ED_FT
Pelvic Pain, Female    Female pelvic pain can be caused by many different things and start from a variety of places. Pelvic pain refers to pain that is located in the lower half of the abdomen and between your hips. The pain may occur over a short period of time (acute) or may be reoccurring (chronic). The cause of pelvic pain may be related to disorders affecting the female reproductive organs (gynecologic), but it may also be related to the bladder, kidney stones, an intestinal complication, or muscle or skeletal problems. Getting help right away for pelvic pain is important, especially if there has been severe, sharp, or a sudden onset of unusual pain. It is also important to get help right away because some types of pelvic pain can be life threatening.     CAUSES  Below are only some of the causes of pelvic pain. The causes of pelvic pain can be in one of several categories.     Gynecologic.   Pelvic inflammatory disease.  Sexually transmitted infection.  Ovarian cyst or a twisted ovarian ligament (ovarian torsion).  Uterine lining that grows outside the uterus (endometriosis).  Fibroids, cysts, or tumors.  Ovulation.   Pregnancy.  Pregnancy that occurs outside the uterus (ectopic pregnancy).  Miscarriage.   Labor.  Abruption of the placenta or ruptured uterus.  Infection.  Uterine infection (endometritis).  Bladder infection.  Diverticulitis.  Miscarriage related to a uterine infection (septic ).  Bladder.  Inflammation of the bladder (cystitis).  Kidney stone(s).  Gastrointestinal.  Constipation.  Diverticulitis.  Neurologic.  Trauma.  Feeling pelvic pain because of mental or emotional causes (psychosomatic).   Cancers of the bowel or pelvis.     EVALUATION  Your caregiver will want to take a careful history of your concerns. This includes recent changes in your health, a careful gynecologic history of your periods (menses), and a sexual history. Obtaining your family history and medical history is also important. Your caregiver may suggest a pelvic exam. A pelvic exam will help identify the location and severity of the pain. It also helps in the evaluation of which organ system may be involved. In order to identify the cause of the pelvic pain and be properly treated, your caregiver may order tests. These tests may include:     A pregnancy test.  Pelvic ultrasonography.  An X-ray exam of the abdomen.  A urinalysis or evaluation of vaginal discharge.  Blood tests.     HOME CARE INSTRUCTIONS  Only take over-the-counter or prescription medicines for pain, discomfort, or fever as directed by your caregiver.    Rest as directed by your caregiver.    Eat a balanced diet.    Drink enough fluids to make your urine clear or pale yellow, or as directed.    Avoid sexual intercourse if it causes pain.    Apply warm or cold compresses to the lower abdomen depending on which one helps the pain.    Avoid stressful situations.    Keep a journal of your pelvic pain. Write down when it started, where the pain is located, and if there are things that seem to be associated with the pain, such as food or your menstrual cycle.  Follow up with your caregiver as directed.       SEEK MEDICAL CARE IF:  Your medicine does not help your pain.  You have abnormal vaginal discharge.     SEEK IMMEDIATE MEDICAL CARE IF:  You have heavy bleeding from the vagina.    Your pelvic pain increases.    You feel light-headed or faint.    You have chills.    You have pain with urination or blood in your urine.    You have uncontrolled diarrhea or vomiting.    You have a fever or persistent symptoms for more than 3 days.  You have a fever and your symptoms suddenly get worse.    You are being physically or sexually abused.    Urinary Tract Infection    A urinary tract infection (UTI) is an infection of any part of the urinary tract, which includes the kidneys, ureters, bladder, and urethra. Risk factors include ignoring your need to urinate, wiping back to front if female, being an uncircumcised male, and having diabetes or a weak immune system. Symptoms include frequent urination, pain or burning with urination, foul smelling urine, cloudy urine, pain in the lower abdomen, blood in the urine, and fever. If you were prescribed an antibiotic medicine, take it as told by your health care provider. Do not stop taking the antibiotic even if you start to feel better.    SEEK IMMEDIATE MEDICAL CARE IF YOU HAVE THE FOLLOWING SYMPTOMS: severe back or abdominal pain, inability to keep fluids or medicine down, dizziness/lightheadedness, or a change in mental status.

## 2023-04-16 NOTE — ED PROVIDER NOTE - CLINICAL SUMMARY MEDICAL DECISION MAKING FREE TEXT BOX
EMS/Outside Facility/Agency (please specify) EMS/Family/Outside Facility/Agency (please specify) Pt presented with complaints of right pelvic pain. Required labs, imaging. No acute findings, pain improved. Will dc with outpt f/up.

## 2025-03-15 ENCOUNTER — EMERGENCY (EMERGENCY)
Facility: HOSPITAL | Age: 35
LOS: 0 days | Discharge: HOME | End: 2025-03-15
Attending: EMERGENCY MEDICINE
Payer: MEDICAID

## 2025-03-15 VITALS
SYSTOLIC BLOOD PRESSURE: 117 MMHG | TEMPERATURE: 99 F | DIASTOLIC BLOOD PRESSURE: 76 MMHG | OXYGEN SATURATION: 98 % | RESPIRATION RATE: 18 BRPM | HEART RATE: 75 BPM

## 2025-03-15 VITALS
SYSTOLIC BLOOD PRESSURE: 108 MMHG | HEIGHT: 65.35 IN | RESPIRATION RATE: 18 BRPM | WEIGHT: 166.01 LBS | HEART RATE: 100 BPM | OXYGEN SATURATION: 99 % | TEMPERATURE: 98 F | DIASTOLIC BLOOD PRESSURE: 78 MMHG

## 2025-03-15 DIAGNOSIS — M54.50 LOW BACK PAIN, UNSPECIFIED: ICD-10-CM

## 2025-03-15 DIAGNOSIS — Z98.890 OTHER SPECIFIED POSTPROCEDURAL STATES: Chronic | ICD-10-CM

## 2025-03-15 PROCEDURE — 81025 URINE PREGNANCY TEST: CPT

## 2025-03-15 PROCEDURE — 99284 EMERGENCY DEPT VISIT MOD MDM: CPT

## 2025-03-15 PROCEDURE — 99283 EMERGENCY DEPT VISIT LOW MDM: CPT

## 2025-03-15 RX ORDER — METHOCARBAMOL 500 MG/1
1500 TABLET, FILM COATED ORAL ONCE
Refills: 0 | Status: COMPLETED | OUTPATIENT
Start: 2025-03-15 | End: 2025-03-15

## 2025-03-15 RX ORDER — OXYCODONE HYDROCHLORIDE AND ACETAMINOPHEN 10; 325 MG/1; MG/1
1 TABLET ORAL ONCE
Refills: 0 | Status: DISCONTINUED | OUTPATIENT
Start: 2025-03-15 | End: 2025-03-15

## 2025-03-15 RX ORDER — METHYLPREDNISOLONE ACETATE 80 MG/ML
1 INJECTION, SUSPENSION INTRA-ARTICULAR; INTRALESIONAL; INTRAMUSCULAR; SOFT TISSUE
Qty: 1 | Refills: 0
Start: 2025-03-15 | End: 2025-03-20

## 2025-03-15 RX ORDER — METHOCARBAMOL 500 MG/1
2 TABLET, FILM COATED ORAL
Qty: 30 | Refills: 0
Start: 2025-03-15 | End: 2025-03-19

## 2025-03-15 RX ORDER — LIDOCAINE HYDROCHLORIDE 20 MG/ML
1 JELLY TOPICAL ONCE
Refills: 0 | Status: COMPLETED | OUTPATIENT
Start: 2025-03-15 | End: 2025-03-15

## 2025-03-15 RX ORDER — LIDOCAINE HYDROCHLORIDE 20 MG/ML
1 JELLY TOPICAL
Qty: 1 | Refills: 0
Start: 2025-03-15 | End: 2025-03-19

## 2025-03-15 RX ORDER — DEXAMETHASONE 0.5 MG/1
10 TABLET ORAL ONCE
Refills: 0 | Status: COMPLETED | OUTPATIENT
Start: 2025-03-15 | End: 2025-03-15

## 2025-03-15 RX ORDER — METHOCARBAMOL 500 MG/1
1000 TABLET, FILM COATED ORAL ONCE
Refills: 0 | Status: COMPLETED | OUTPATIENT
Start: 2025-03-15 | End: 2025-03-15

## 2025-03-15 RX ADMIN — DEXAMETHASONE 10 MILLIGRAM(S): 0.5 TABLET ORAL at 18:40

## 2025-03-15 RX ADMIN — METHOCARBAMOL 1000 MILLIGRAM(S): 500 TABLET, FILM COATED ORAL at 21:34

## 2025-03-15 RX ADMIN — LIDOCAINE HYDROCHLORIDE 1 PATCH: 20 JELLY TOPICAL at 18:40

## 2025-03-15 RX ADMIN — METHOCARBAMOL 1500 MILLIGRAM(S): 500 TABLET, FILM COATED ORAL at 18:41

## 2025-03-15 RX ADMIN — OXYCODONE HYDROCHLORIDE AND ACETAMINOPHEN 1 TABLET(S): 10; 325 TABLET ORAL at 18:41

## 2025-03-16 PROBLEM — E28.2 POLYCYSTIC OVARIAN SYNDROME: Chronic | Status: ACTIVE | Noted: 2022-01-07

## 2025-04-15 ENCOUNTER — APPOINTMENT (OUTPATIENT)
Dept: NEUROSURGERY | Facility: CLINIC | Age: 35
End: 2025-04-15
Payer: MEDICAID

## 2025-04-15 VITALS — WEIGHT: 160 LBS | BODY MASS INDEX: 29.44 KG/M2 | HEIGHT: 62 IN

## 2025-04-15 DIAGNOSIS — M54.16 RADICULOPATHY, LUMBAR REGION: ICD-10-CM

## 2025-04-15 PROCEDURE — 99204 OFFICE O/P NEW MOD 45 MIN: CPT
